# Patient Record
Sex: FEMALE | Race: BLACK OR AFRICAN AMERICAN | NOT HISPANIC OR LATINO | URBAN - METROPOLITAN AREA
[De-identification: names, ages, dates, MRNs, and addresses within clinical notes are randomized per-mention and may not be internally consistent; named-entity substitution may affect disease eponyms.]

---

## 2018-09-18 ENCOUNTER — OUTPATIENT (OUTPATIENT)
Dept: EMERGENCY DEPT | Facility: HOSPITAL | Age: 38
LOS: 1 days | Discharge: HOME | End: 2018-09-18
Payer: COMMERCIAL

## 2018-09-18 VITALS
OXYGEN SATURATION: 100 % | SYSTOLIC BLOOD PRESSURE: 146 MMHG | HEART RATE: 115 BPM | RESPIRATION RATE: 18 BRPM | DIASTOLIC BLOOD PRESSURE: 81 MMHG

## 2018-09-18 LAB
ALBUMIN SERPL ELPH-MCNC: 4.6 G/DL — SIGNIFICANT CHANGE UP (ref 3.3–5)
ALP SERPL-CCNC: 46 U/L — SIGNIFICANT CHANGE UP (ref 40–120)
ALT FLD-CCNC: 12 U/L — SIGNIFICANT CHANGE UP (ref 4–33)
APTT BLD: 31.7 SEC — SIGNIFICANT CHANGE UP (ref 27.5–37.4)
AST SERPL-CCNC: 16 U/L — SIGNIFICANT CHANGE UP (ref 4–32)
BASOPHILS # BLD AUTO: 0.03 K/UL — SIGNIFICANT CHANGE UP (ref 0–0.2)
BASOPHILS NFR BLD AUTO: 0.5 % — SIGNIFICANT CHANGE UP (ref 0–2)
BILIRUB SERPL-MCNC: 0.5 MG/DL — SIGNIFICANT CHANGE UP (ref 0.2–1.2)
BUN SERPL-MCNC: 14 MG/DL — SIGNIFICANT CHANGE UP (ref 7–23)
CALCIUM SERPL-MCNC: 9 MG/DL — SIGNIFICANT CHANGE UP (ref 8.4–10.5)
CHLORIDE SERPL-SCNC: 102 MMOL/L — SIGNIFICANT CHANGE UP (ref 98–107)
CO2 SERPL-SCNC: 18 MMOL/L — LOW (ref 22–31)
CREAT SERPL-MCNC: 0.69 MG/DL — SIGNIFICANT CHANGE UP (ref 0.5–1.3)
EOSINOPHIL # BLD AUTO: 0.06 K/UL — SIGNIFICANT CHANGE UP (ref 0–0.5)
EOSINOPHIL NFR BLD AUTO: 1 % — SIGNIFICANT CHANGE UP (ref 0–6)
GLUCOSE SERPL-MCNC: 92 MG/DL — SIGNIFICANT CHANGE UP (ref 70–99)
HCG SERPL-ACNC: < 5 MIU/ML — SIGNIFICANT CHANGE UP
HCT VFR BLD CALC: 38.5 % — SIGNIFICANT CHANGE UP (ref 34.5–45)
HGB BLD-MCNC: 12.5 G/DL — SIGNIFICANT CHANGE UP (ref 11.5–15.5)
IMM GRANULOCYTES # BLD AUTO: 0.01 # — SIGNIFICANT CHANGE UP
IMM GRANULOCYTES NFR BLD AUTO: 0.2 % — SIGNIFICANT CHANGE UP (ref 0–1.5)
INR BLD: 1 — SIGNIFICANT CHANGE UP (ref 0.88–1.17)
LYMPHOCYTES # BLD AUTO: 2.39 K/UL — SIGNIFICANT CHANGE UP (ref 1–3.3)
LYMPHOCYTES # BLD AUTO: 39.6 % — SIGNIFICANT CHANGE UP (ref 13–44)
MCHC RBC-ENTMCNC: 31.3 PG — SIGNIFICANT CHANGE UP (ref 27–34)
MCHC RBC-ENTMCNC: 32.5 % — SIGNIFICANT CHANGE UP (ref 32–36)
MCV RBC AUTO: 96.3 FL — SIGNIFICANT CHANGE UP (ref 80–100)
MONOCYTES # BLD AUTO: 0.42 K/UL — SIGNIFICANT CHANGE UP (ref 0–0.9)
MONOCYTES NFR BLD AUTO: 7 % — SIGNIFICANT CHANGE UP (ref 2–14)
NEUTROPHILS # BLD AUTO: 3.13 K/UL — SIGNIFICANT CHANGE UP (ref 1.8–7.4)
NEUTROPHILS NFR BLD AUTO: 51.7 % — SIGNIFICANT CHANGE UP (ref 43–77)
NRBC # FLD: 0 — SIGNIFICANT CHANGE UP
NT-PROBNP SERPL-SCNC: 34.55 PG/ML — SIGNIFICANT CHANGE UP
PLATELET # BLD AUTO: 255 K/UL — SIGNIFICANT CHANGE UP (ref 150–400)
PMV BLD: 9.5 FL — SIGNIFICANT CHANGE UP (ref 7–13)
POTASSIUM SERPL-MCNC: 3.2 MMOL/L — LOW (ref 3.5–5.3)
POTASSIUM SERPL-SCNC: 3.2 MMOL/L — LOW (ref 3.5–5.3)
PROT SERPL-MCNC: 7.7 G/DL — SIGNIFICANT CHANGE UP (ref 6–8.3)
PROTHROM AB SERPL-ACNC: 11.1 SEC — SIGNIFICANT CHANGE UP (ref 9.8–13.1)
RBC # BLD: 4 M/UL — SIGNIFICANT CHANGE UP (ref 3.8–5.2)
RBC # FLD: 12.2 % — SIGNIFICANT CHANGE UP (ref 10.3–14.5)
SODIUM SERPL-SCNC: 139 MMOL/L — SIGNIFICANT CHANGE UP (ref 135–145)
TROPONIN T, HIGH SENSITIVITY: < 6 NG/L — SIGNIFICANT CHANGE UP (ref ?–14)
WBC # BLD: 6.04 K/UL — SIGNIFICANT CHANGE UP (ref 3.8–10.5)
WBC # FLD AUTO: 6.04 K/UL — SIGNIFICANT CHANGE UP (ref 3.8–10.5)

## 2018-09-18 RX ORDER — POTASSIUM CHLORIDE 20 MEQ
40 PACKET (EA) ORAL ONCE
Qty: 0 | Refills: 0 | Status: COMPLETED | OUTPATIENT
Start: 2018-09-18 | End: 2018-09-18

## 2018-09-18 RX ORDER — ASPIRIN/CALCIUM CARB/MAGNESIUM 324 MG
162 TABLET ORAL ONCE
Qty: 0 | Refills: 0 | Status: COMPLETED | OUTPATIENT
Start: 2018-09-18 | End: 2018-09-18

## 2018-09-18 RX ORDER — MORPHINE SULFATE 50 MG/1
4 CAPSULE, EXTENDED RELEASE ORAL ONCE
Qty: 0 | Refills: 0 | Status: DISCONTINUED | OUTPATIENT
Start: 2018-09-18 | End: 2018-09-18

## 2018-09-18 RX ADMIN — Medication 40 MILLIEQUIVALENT(S): at 22:24

## 2018-09-18 RX ADMIN — Medication 162 MILLIGRAM(S): at 20:23

## 2018-09-18 RX ADMIN — MORPHINE SULFATE 4 MILLIGRAM(S): 50 CAPSULE, EXTENDED RELEASE ORAL at 20:23

## 2018-09-18 NOTE — ED PROVIDER NOTE - PROGRESS NOTE DETAILS
called to eval patient for stroke code.  patient states she had heaviness in arms, but not weakness. full strength in UE b/l.  full strength LE b/L.  stroke code not called 2/2 no cva symptoms Cabot: Patient's cardiologist is Dr. Moses, [phone].  Was planning to cath her soon and advised her to come to the ED if her CP recurred. LATIA Herr: Received signout on pt pending CTa and discussion with her cardiologist, CTa without PE, spoke with  who like pt admitted to  [phone], left message with  pending callback LATIA Herr: Unable to reach , spoke with hospitalist Dr. Patel who accepts this pt to her service,  to see in the morning

## 2018-09-18 NOTE — ED ADULT NURSE NOTE - NSIMPLEMENTINTERV_GEN_ALL_ED
Implemented All Universal Safety Interventions:  [place] to call system. Call bell, personal items and telephone within reach. Instruct patient to call for assistance. Room bathroom lighting operational. Non-slip footwear when patient is off stretcher. Physically safe environment: no spills, clutter or unnecessary equipment. Stretcher in lowest position, wheels locked, appropriate side rails in place.

## 2018-09-18 NOTE — ED ADULT NURSE NOTE - CHIEF COMPLAINT QUOTE
Pt c/o midsternal chest pain and left arm numbness x 3 hours, c/o SOB, unable to stand due to symptoms, crying in triage. Pt reports PMH of CVA, presented with left sided weakness. Pt also states she's supposed to be scheduled for cardiac cath due to chest pain she had over the past 2 weeks. Pt now reports numbness and tingling to face and states her legs feel heavy. FS 76. Evaluated by Dr. Guaman, no code stroke called.

## 2018-09-18 NOTE — ED ADULT NURSE REASSESSMENT NOTE - NS ED NURSE REASSESS COMMENT FT1
Report received from night RN Poncho. Pt able to ambulate independently to bathroom. Pt denies any chest pain, chest discomfort, SOB, difficulty breathing, N&V at this time and states she "feels completely better". Respirations are even and unlabored. breath sounds are clear.

## 2018-09-18 NOTE — ED PROVIDER NOTE - ATTENDING CONTRIBUTION TO CARE
I, Jennifer Cabot, MD, have performed a history and physical exam of the patient and discussed their management with the resident. I reviewed the resident's note and agree with the documented findings and plan of care. My medical decision making and observations are found above.    Cabot: 38F wth PMH of CVA w/o residual deficits, recent CP with stress last week showing "blockages" with her cardiologist, Dr. Moses, planning to cath, coming in for worsened CP and SOB.  On exam, HDS, NAD, MMM, eyes clear, lungs CTAB, heart sounds normal, abd soft, NT, ND, no CVAT, LEs without edema, wwp, skin normal temperature and color, neuro: alert and oriented, no focal deficits.  EKG with RBBB.  First trop < 6.  DDx MSK pain, ACS, PE.  Will trend trops, obtain CTA chest, and speak with her cardiologist's office.  Anticipate admission for cath.

## 2018-09-18 NOTE — ED ADULT TRIAGE NOTE - CHIEF COMPLAINT QUOTE
Pt c/o midsternal chest pain and left arm numbness x 3 hours, c/o SOB, unable to stand due to symptoms, crying in triage. Pt reports PMH of CVA, presented with left sided weakness. Pt also states she's supposed to be scheduled for cardiac cath due to chest pain she had. Pt now reports numbness and tingling to face and states her legs feel heavy. Pt c/o midsternal chest pain and left arm numbness x 3 hours, c/o SOB, unable to stand due to symptoms, crying in triage. Pt reports PMH of CVA, presented with left sided weakness. Pt also states she's supposed to be scheduled for cardiac cath due to chest pain she had over the past 2 weeks. Pt now reports numbness and tingling to face and states her legs feel heavy. FS 76. Evaluated by Dr. Guaman, no code stroke called.

## 2018-09-18 NOTE — ED ADULT NURSE NOTE - OBJECTIVE STATEMENT
Pt to bed 1. Alert and oriented x 3. Pt c/o intermittent chest tightness for a few months. Pt today developed left sided arm numbness and tingling. Pt hooked up to CM. IVL placed. Bloods drawn. Will continue to monitor.

## 2018-09-18 NOTE — ED PROVIDER NOTE - MEDICAL DECISION MAKING DETAILS
38F hx cva p/w cp, sob. reportedly supposed to get cath soon. pt with no rfs for PE, but with pleuritic CP, tachycardic, tachypnic and with RBBB on ekg. will CTA. discuss with cards. reassess. likely admit 38F hx cva p/w cp, sob. reportedly supposed to get cath soon. pt with no rfs for PE, but with pleuritic CP, tachycardic, tachypnic and with RBBB on ekg. will CTA. discuss with cards. reassess. likely admit.    Cabot: 38F wth PMH of CVA w/o residual deficits, recent CP with stress last week showing "blockages" with her cardiologist, Dr. Moses, planning to cath, coming in for worsened CP and SOB.  On exam, HDS, NAD, MMM, eyes clear, lungs CTAB, heart sounds normal, abd soft, NT, ND, no CVAT, LEs without edema, wwp, skin normal temperature and color, neuro: alert and oriented, no focal deficits.  EKG with RBBB.  First trop < 6.  DDx MSK pain, ACS, PE.  Will trend trops, obtain CTA chest, and speak with her cardiologist's office.  Anticipate admission for cath.

## 2018-09-18 NOTE — ED PROVIDER NOTE - OBJECTIVE STATEMENT
Tiffani Riggins M.D: 38F hx CVA w/o residual deficits, p/w episode of chest tightness assocaited with nausea, SOB and left arm numbness/tingling/heaviness. has been intermittent for the past two weeks is supposed to get a cath Tiffani Riggins M.D: 38F hx CVA w/o residual deficits, p/w episode of chest tightness assocaited with nausea, SOB and left arm numbness/tingling/heaviness. has been intermittent for the past two weeks is supposed to get a cath soon with her cardiologist. notes symptoms have been worsening throughout the day prompting visit. cp has some pleuritic nature to it    cards: vanessa

## 2018-09-18 NOTE — ED PROVIDER NOTE - PHYSICAL EXAMINATION
Tiffani Riggins M.D.:   patient awake alert seen lying on stretcher, tachyppnic, not finishing full sentences. .   LUNGS CTAB no wheeze no crackle.   CARD RRR no m/r/g.    Abdomen soft NT ND no rebound no guarding no CVA tenderness.   EXT WWP no edema no calf tenderness CV 2+DP/PT bilaterally.   neuro A&Ox3 gait normal.    skin warm and dry no rash  HEENT: moist mucous membranes, PERRL, EOMI

## 2018-09-19 ENCOUNTER — TRANSCRIPTION ENCOUNTER (OUTPATIENT)
Age: 38
End: 2018-09-19

## 2018-09-19 VITALS — HEIGHT: 66 IN | WEIGHT: 160.06 LBS

## 2018-09-19 DIAGNOSIS — R07.9 CHEST PAIN, UNSPECIFIED: ICD-10-CM

## 2018-09-19 DIAGNOSIS — I63.9 CEREBRAL INFARCTION, UNSPECIFIED: ICD-10-CM

## 2018-09-19 DIAGNOSIS — Z29.9 ENCOUNTER FOR PROPHYLACTIC MEASURES, UNSPECIFIED: ICD-10-CM

## 2018-09-19 DIAGNOSIS — I20.0 UNSTABLE ANGINA: ICD-10-CM

## 2018-09-19 DIAGNOSIS — I20.1 ANGINA PECTORIS WITH DOCUMENTED SPASM: ICD-10-CM

## 2018-09-19 LAB
BUN SERPL-MCNC: 11 MG/DL — SIGNIFICANT CHANGE UP (ref 7–23)
CALCIUM SERPL-MCNC: 8.3 MG/DL — LOW (ref 8.4–10.5)
CHLORIDE SERPL-SCNC: 104 MMOL/L — SIGNIFICANT CHANGE UP (ref 98–107)
CO2 SERPL-SCNC: 22 MMOL/L — SIGNIFICANT CHANGE UP (ref 22–31)
CREAT SERPL-MCNC: 0.64 MG/DL — SIGNIFICANT CHANGE UP (ref 0.5–1.3)
GLUCOSE SERPL-MCNC: 85 MG/DL — SIGNIFICANT CHANGE UP (ref 70–99)
POTASSIUM SERPL-MCNC: 3.9 MMOL/L — SIGNIFICANT CHANGE UP (ref 3.5–5.3)
POTASSIUM SERPL-SCNC: 3.9 MMOL/L — SIGNIFICANT CHANGE UP (ref 3.5–5.3)
SODIUM SERPL-SCNC: 138 MMOL/L — SIGNIFICANT CHANGE UP (ref 135–145)
TROPONIN T, HIGH SENSITIVITY: < 6 NG/L — SIGNIFICANT CHANGE UP (ref ?–14)

## 2018-09-19 PROCEDURE — 93010 ELECTROCARDIOGRAM REPORT: CPT

## 2018-09-19 RX ORDER — HEPARIN SODIUM 5000 [USP'U]/ML
5000 INJECTION INTRAVENOUS; SUBCUTANEOUS EVERY 12 HOURS
Qty: 0 | Refills: 0 | Status: DISCONTINUED | OUTPATIENT
Start: 2018-09-19 | End: 2018-09-19

## 2018-09-19 RX ORDER — SIMVASTATIN 20 MG/1
40 TABLET, FILM COATED ORAL AT BEDTIME
Qty: 0 | Refills: 0 | Status: DISCONTINUED | OUTPATIENT
Start: 2018-09-19 | End: 2018-09-19

## 2018-09-19 RX ORDER — SODIUM CHLORIDE 9 MG/ML
500 INJECTION INTRAMUSCULAR; INTRAVENOUS; SUBCUTANEOUS
Qty: 0 | Refills: 0 | Status: DISCONTINUED | OUTPATIENT
Start: 2018-09-19 | End: 2018-09-19

## 2018-09-19 RX ORDER — ASPIRIN/CALCIUM CARB/MAGNESIUM 324 MG
81 TABLET ORAL DAILY
Qty: 0 | Refills: 0 | Status: DISCONTINUED | OUTPATIENT
Start: 2018-09-19 | End: 2018-09-19

## 2018-09-19 RX ORDER — SIMVASTATIN 20 MG/1
1 TABLET, FILM COATED ORAL
Qty: 0 | Refills: 0 | COMMUNITY
Start: 2018-09-19

## 2018-09-19 RX ADMIN — Medication 81 MILLIGRAM(S): at 11:41

## 2018-09-19 NOTE — DISCHARGE NOTE ADULT - PLAN OF CARE
Resolution of symptoms Follow up with your Cardiologist or Primary care doctor in 1-2 weeks  If symptoms return call your doctor and if unreachable return to Emergency Department Continue Aspirin 81mg

## 2018-09-19 NOTE — H&P ADULT - RS GEN PE MLT RESP DETAILS PC
respirations non-labored/good air movement/no wheezes/no rhonchi/clear to auscultation bilaterally/no chest wall tenderness/airway patent/no rales/breath sounds equal

## 2018-09-19 NOTE — DISCHARGE NOTE ADULT - MEDICATION SUMMARY - MEDICATIONS TO STOP TAKING
I will STOP taking the medications listed below when I get home from the hospital:    aspirin 325 mg oral tablet  -- 1 tab(s) by mouth once a day    simvastatin 40 mg oral tablet  -- 1 tab(s) by mouth once a day (at bedtime)    clopidogrel 75 mg oral tablet  -- 1 tab(s) by mouth once a day    heparin 10,000 units/mL injectable solution  -- 1 milliliter(s) injectable once a day    simvastatin 40 mg oral tablet  -- 1 tab(s) by mouth once a day (at bedtime)

## 2018-09-19 NOTE — DISCHARGE NOTE ADULT - PATIENT PORTAL LINK FT
You can access the [facility] Patient Portal, offered by [facility], by registering with the following website: [email or web address]

## 2018-09-19 NOTE — H&P ADULT - NEGATIVE MUSCULOSKELETAL SYMPTOMS
no muscle cramps/no muscle weakness/no neck pain/no joint swelling/no myalgia/no arm pain L/no arm pain R/no arthritis/no stiffness/no leg pain R/no back pain/no leg pain L/no arthralgia

## 2018-09-19 NOTE — DISCHARGE NOTE ADULT - HOSPITAL COURSE
39 y/o W with PHMx CVA w/o residual deficits July 2015 presents to the ED with chest tightness, SOB, bilateral arm numbness/heaviness, L face tingling/heaviness x 3 days. Chest pain has been intermittent for 2 wks, each episode lasts a few minutes associated with SOB, diaphoresis,  and nausea. Chest tightness worsened yesterday evening as she was sitting at home, had 3 episodes within 5 minutes, substernal, non-radiating, 7/10, worse with walking/doing household chores, better with rest, not changed with diet/not reproducible/not pleuritic/ not positional. Pt admits to L>R arm tingling/heaviness and L face numbness/tingling started yesterday, L arm felt like "a ton of bricks." Pt reports mouth felt like pins and needles with tingling on the L side of her face. Pt never had these symptoms before. Pt saw her cardiologist Dr. Moses last week. Dr. Moses states patient had positive outpatient stress test and planned for LHC for patient. Pt denies jaw pain, intermittent claudication, palpitations, orthopnea, syncope, cough, facial droop, slurred speech, weakness, paraesthesias, peripheral edema, abdominal pain, V/C/D neck pain, vision changes, fevers, chills.     Pt had CTPA showing no PE.  Pt had cardiac catheretization showing normal coronaries.  Pt stable for discharge home and plan discussed with Attending

## 2018-09-19 NOTE — H&P ADULT - PROBLEM SELECTOR PLAN 1
Admit to telemetry for monitoring and cardiac w/u   Serial EKGs.   Continue ASA. Start Simvastatin and Heparin 5000U SQ TID  AM labs HgA1C, FLP, TSH, CBC, CMP Admit to telemetry for monitoring and cardiac w/u   Serial EKGs.   Continue ASA. Start Simvastatin and Heparin 5000U SQ TID  AM labs HgA1C, FLP, TSH, CBC, CMP  Consider cardiac cath, consult with Dr. Moses regarding plan Possible cardiac catheterization with possible coronary angioplasty and stent placement  CE x 3, telemetry , serial EKG's   asa, zocor consider add BB Patient presents with exertional chest pain/pressure x2 weeks. Reportedly with positive outpatient stress test. No ST/T changes on ECG. hsTPN wnl x2. Low suspicion for acute infarction or unstable angina.    Cardiology recommendations reviewed. Patient to undergo LHC today.    -c/w ASA  -f/u cardiology recs

## 2018-09-19 NOTE — H&P ADULT - NSHPLABSRESULTS_GEN_ALL_CORE
LABS:                        12.5   6.04  )-----------( 255      ( 18 Sep 2018 19:44 )             38.5     09-18    139  |  102  |  14  ----------------------------<  92  3.2<L>   |  18<L>  |  0.69    Ca    9.0      18 Sep 2018 19:44    TPro  7.7  /  Alb  4.6  /  TBili  0.5  /  DBili  x   /  AST  16  /  ALT  12  /  AlkPhos  46  09-18    PT/INR - ( 18 Sep 2018 19:44 )   PT: 11.1 SEC;   INR: 1.00          PTT - ( 18 Sep 2018 19:44 )  PTT:31.7 SEC            RADIOLOGY & ADDITIONAL TESTS:    ECG Personally Reviewed - SR, RBBB    Imaging Personally Reviewed: CXR clear lungs    Imaging Reviewed: CT angio chest - no PE or pneumonia    Consultant(s) Notes Reviewed:  Cardiology    Care Discussed with Consultants/Other Providers:

## 2018-09-19 NOTE — H&P ADULT - HISTORY OF PRESENT ILLNESS
39 y/o F with PHMx CVA w/o residual deficits July 2015 presents to the ED with chest tightness, SOB, L>R arm numbness/heaviness, L face tingling/heaviness. Chest pain has been intermittent for 2 wks, each episode lasts a few minutes associated with SOB, diaphoresis, cold sweats, and nausea. Chest tightness worsened yesterday evening as she was sitting at home, had 3 episodes within 5 minutes, substernal, non-radiating, 7/10, worse with walking/doing household chores, better with rest, not changed with diet/reproducible/pleuritic/positional. Pt admits to L>R arm tingling/heaviness and L face numbness/tingling started yesterday, L arm felt like "a tone of bricks." Pt reports mouth felt like pins and needles with tingling on the L side of her face. Pt never had these symptoms before. Pt saw her cardiologist Dr. Moses last week, was planning on getting a cath soon.     Pt denies jaw pain, claudication, palpitations, orthopnea, cough, facial droop, slurred speech, weakness, peripheral edema, abdominal pain, neck pain, vision changes, fevers, chills. 37 y/o F with PHMx CVA w/o residual deficits July 2015 presents to the ED with chest tightness, SOB, L>R arm numbness/heaviness, L face tingling/heaviness. Chest pain has been intermittent for 2 wks, each episode lasts a few minutes associated with SOB, diaphoresis, cold sweats, and nausea. Chest tightness worsened yesterday evening as she was sitting at home, had 3 episodes within 5 minutes, substernal, non-radiating, 7/10, worse with walking/doing household chores, better with rest, not changed with diet/reproducible/pleuritic/positional. Pt admits to L>R arm tingling/heaviness and L face numbness/tingling started yesterday, L arm felt like "a ton of bricks." Pt reports mouth felt like pins and needles with tingling on the L side of her face. Pt never had these symptoms before. Pt saw her cardiologist Dr. Moses last week, was planning on getting a cath soon.     Pt denies jaw pain, claudication, palpitations, orthopnea, cough, facial droop, slurred speech, weakness, peripheral edema, abdominal pain, neck pain, vision changes, fevers, chills. 39 y/o F with PHMx CVA w/o residual deficits July 2015 presents to the ED with chest tightness, SOB, bilateral  arm numbness/heaviness, L face tingling/heaviness x 3 days. Chest pain has been intermittent for 2 wks, each episode lasts a few minutes associated with SOB, diaphoresis,  and nausea. Chest tightness worsened yesterday evening as she was sitting at home, had 3 episodes within 5 minutes, substernal, non-radiating, 7/10, worse with walking/doing household chores, better with rest, not changed with diet/not reproducible/not pleuritic/ not positional. Pt admits to L>R arm tingling/heaviness and L face numbness/tingling started yesterday, L arm felt like "a ton of bricks." Pt reports mouth felt like pins and needles with tingling on the L side of her face. Pt never had these symptoms before. Pt saw her cardiologist Dr. Moses last week, was planning on getting a cath soon. Pt denies jaw pain, intermittent claudication, palpitations, orthopnea, syncope , cough, facial droop, slurred speech, weakness, paraesthesias, peripheral edema, abdominal pain, V/C/D neck pain, vision changes, fevers, chills. 39 y/o W with PHMx CVA w/o residual deficits July 2015 presents to the ED with chest tightness, SOB, bilateral arm numbness/heaviness, L face tingling/heaviness x 3 days. Chest pain has been intermittent for 2 wks, each episode lasts a few minutes associated with SOB, diaphoresis,  and nausea. Chest tightness worsened yesterday evening as she was sitting at home, had 3 episodes within 5 minutes, substernal, non-radiating, 7/10, worse with walking/doing household chores, better with rest, not changed with diet/not reproducible/not pleuritic/ not positional. Pt admits to L>R arm tingling/heaviness and L face numbness/tingling started yesterday, L arm felt like "a ton of bricks." Pt reports mouth felt like pins and needles with tingling on the L side of her face. Pt never had these symptoms before. Pt saw her cardiologist Dr. Moses last week. Dr. Moses states patient had positive outpatient stress test and planned for LHC for patient. Pt denies jaw pain, intermittent claudication, palpitations, orthopnea, syncope, cough, facial droop, slurred speech, weakness, paraesthesias, peripheral edema, abdominal pain, V/C/D neck pain, vision changes, fevers, chills.

## 2018-09-19 NOTE — ED ADULT NURSE REASSESSMENT NOTE - NS ED NURSE REASSESS COMMENT FT1
pt is admitted to tele pt VSS NAD pt is awaiting for a bed assignment awaiting further orders Will continue to monitor the pt

## 2018-09-19 NOTE — H&P ADULT - FAMILY HISTORY
Family history of hypertrophic cardiomyopathy, cousin age 23     Aunt  Still living? Unknown  Family history of migraine headaches, Age at diagnosis: Age Unknown     Uncle  Still living? Unknown  Family history of CVA, Age at diagnosis: Age Unknown  Family history of TIAs, Age at diagnosis: Age Unknown     Grandparent  Still living? Unknown  Family history of mitral valve prolapse, Age at diagnosis: Age Unknown     Father  Still living? Unknown  Family history of hypertension in father, Age at diagnosis: Age Unknown  Family history of hyperlipidemia, Age at diagnosis: Age Unknown

## 2018-09-19 NOTE — H&P ADULT - ASSESSMENT
37 y/o F with PHMx CVA w/o residual deficits July 2015 presents to the ED with chest tightness, SOB, L>R arm numbness/heaviness, L face tingling/heaviness found to have negative cardiac markers, no pulmonary embolism or pneumonia seen on CTA admitted to telemetry for monitoring and cardiac w/u    EKG:   Trop: <6 --> <6  BNP: 34  CTA: No pulmonary embolism or pneumonia. 37 y/o F with PHMx CVA w/o residual deficits July 2015 presents to the ED with Unstable angina MI rule dout    Trop: <6 --> <6  BNP: 34  CTA: No pulmonary embolism or pneumonia. 37 y/o F with PHMx CVA w/o residual deficits July 2015 presents to the ED with c/o chest pain. Patient reportedly with positive stress test as outpatient. Seen by cardiology who recommends LHC.

## 2018-09-19 NOTE — DISCHARGE NOTE ADULT - CARE PROVIDER_API CALL
Yunior Rees), Medicine  [address] [place] [address]  Phone: [phone]  Fax: [phone]    Boubacar Moses (), Cardiology; Internal Medicine  [address] [place] [address] [address]  [place] [address]  Phone: [phone]  Fax: [phone]

## 2018-09-19 NOTE — H&P ADULT - NEGATIVE GENERAL SYMPTOMS
no anorexia/no weight loss/no malaise/no fatigue/no weight gain/no polyphagia/no polyuria/no polydipsia/no fever/no chills

## 2018-09-19 NOTE — H&P ADULT - REASON FOR ADMISSION
chest tightness, SOB, L>R arm numbness/tingling/heaviness, L face tingling/numbness "chest tightness  x 2 weeks"

## 2018-09-19 NOTE — DISCHARGE NOTE ADULT - MEDICATION SUMMARY - MEDICATIONS TO TAKE
I will START or STAY ON the medications listed below when I get home from the hospital:    Aspirin Enteric Coated 81 mg oral delayed release tablet  -- 1 tab(s) by mouth once a day  -- Indication: For CVA (cerebral vascular accident)

## 2018-09-19 NOTE — H&P ADULT - NSHPPHYSICALEXAM_GEN_ALL_CORE
Vital Signs Last 24 Hrs  T(C): 36.8 (19 Sep 2018 06:25), Max: 37.1 (19 Sep 2018 04:02)  T(F): 98.3 (19 Sep 2018 06:25), Max: 98.7 (19 Sep 2018 04:02)  HR: 66 (19 Sep 2018 06:25) (66 - 115)  BP: 108/72 (19 Sep 2018 06:25) (108/72 - 146/81)  BP(mean): --  RR: 16 (19 Sep 2018 06:25) (15 - 18)  SpO2: 100% (19 Sep 2018 06:25) (99% - 100%)

## 2018-09-19 NOTE — H&P ADULT - NEGATIVE OPHTHALMOLOGIC SYMPTOMS
no diplopia/no pain R/no loss of vision L/no loss of vision R/no pain L/no blurred vision L/no blurred vision R

## 2018-09-19 NOTE — H&P ADULT - NEGATIVE NEUROLOGICAL SYMPTOMS
no syncope/no weakness/no generalized seizures/no loss of consciousness/no transient paralysis/no headache/no focal seizures/no difficulty walking

## 2018-09-19 NOTE — DISCHARGE NOTE ADULT - CARE PLAN
Principal Discharge DX:	Chest pain, unspecified type  Goal:	Resolution of symptoms  Assessment and plan of treatment:	Follow up with your Cardiologist or Primary care doctor in 1-2 weeks  If symptoms return call your doctor and if unreachable return to Emergency Department  Secondary Diagnosis:	CVA (cerebral vascular accident)  Assessment and plan of treatment:	Continue Aspirin 81mg

## 2018-09-19 NOTE — H&P ADULT - NSHPSOCIALHISTORY_GEN_ALL_CORE
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 Never drinks alcohol, smoke or use illicit drugs.

## 2018-09-19 NOTE — H&P ADULT - NEGATIVE GASTROINTESTINAL SYMPTOMS
no abdominal pain/no melena/no hematochezia/no change in bowel habits/no vomiting/no diarrhea/no constipation

## 2018-09-19 NOTE — PROGRESS NOTE ADULT - SUBJECTIVE AND OBJECTIVE BOX
CHIEF COMPLAINT: chest pain    HISTORY OF PRESENT ILLNESS: Patient is a 38 year old female with past medical history significant for CVA, who presents with complaints of left sided chest pain ongoing for the past few hours intermittently. Patient is known to me from my outpatient practice and recently had an exercise stress echocardiogram and was awaiting an outpatient cardiac catheterization.    PAST MEDICAL & SURGICAL HISTORY:  CVA (cerebral vascular accident): s/p July 2015  No significant past surgical history          MEDICATIONS:  aspirin enteric coated 81 milliGRAM(s) Oral daily  heparin  Injectable 5000 Unit(s) SubCutaneous every 12 hours            simvastatin 40 milliGRAM(s) Oral at bedtime              REVIEW OF SYSTEMS:  CONSTITUTIONAL: No fever, weight loss, or fatigue  EYES: No eye pain, visual disturbances, or discharge  ENMT:  No difficulty hearing, tinnitus, vertigo; No sinus or throat pain  NECK: No pain or stiffness  RESPIRATORY: No cough, wheezing, chills or hemoptysis; No Shortness of Breath  CARDIOVASCULAR: + Chest pain  GASTROINTESTINAL: No abdominal or epigastric pain. No nausea, vomiting, or hematemesis; No diarrhea or constipation. No melena or hematochezia.  GENITOURINARY: No dysuria, frequency, hematuria, or incontinence  NEUROLOGICAL: No headaches, memory loss, loss of strength, numbness, or tremors  SKIN: No itching, burning, rashes, or lesions   LYMPH Nodes: No enlarged glands  ENDOCRINE: No heat or cold intolerance; No hair loss  MUSCULOSKELETAL: No joint pain or swelling; No muscle, back, or extremity pain  PSYCHIATRIC: No depression, anxiety, mood swings, or difficulty sleeping  HEME/LYMPH: No easy bruising, or bleeding gums  ALLERY AND IMMUNOLOGIC: No hives or eczema	      PHYSICAL EXAM:  T(C): 36.8 (09-19-18 @ 06:25), Max: 37.1 (09-19-18 @ 04:02)  HR: 66 (09-19-18 @ 06:25) (66 - 115)  BP: 108/72 (09-19-18 @ 06:25) (108/72 - 146/81)  RR: 16 (09-19-18 @ 06:25) (15 - 18)  SpO2: 100% (09-19-18 @ 06:25) (99% - 100%)  Wt(kg): --  I&O's Summary      Appearance: Normal	  HEENT:   Normal oral mucosa, PERRL, EOMI	  Lymphatic: No lymphadenopathy  Cardiovascular: Normal S1 S2, No JVD, No murmurs, No edema  Respiratory: Lungs clear to auscultation	  Psychiatry: A & O x 3, Mood & affect appropriate  Gastrointestinal:  Soft, Non-tender, + BS	  Skin: No rashes, No ecchymoses, No cyanosis	  Neurologic: Non-focal  Extremities: Normal range of motion, No clubbing, cyanosis or edema  Vascular: Peripheral pulses palpable 2+ bilaterally    TELEMETRY: Sinus rhythm    ECG:  Sinus rhythm, Nonspecific ST changes  	  	  LABS:	 	    CARDIAC MARKERS:                                  12.5   6.04  )-----------( 255      ( 18 Sep 2018 19:44 )             38.5     09-18    139  |  102  |  14  ----------------------------<  92  3.2<L>   |  18<L>  |  0.69    Ca    9.0      18 Sep 2018 19:44    TPro  7.7  /  Alb  4.6  /  TBili  0.5  /  DBili  x   /  AST  16  /  ALT  12  /  AlkPhos  46  09-18    proBNP: Serum Pro-Brain Natriuretic Peptide: 34.55 pg/mL (09-18 @ 19:44)

## 2018-09-19 NOTE — ED ADULT NURSE REASSESSMENT NOTE - NS ED NURSE REASSESS COMMENT FT1
Report received from break MARGE Mcclendon. Pt is resting in spot 5A at this time. Pt denies any chest pain,, SOB, difficulty breathing, or any discomfort at this time.

## 2018-09-27 RX ORDER — ASPIRIN/CALCIUM CARB/MAGNESIUM 324 MG
1 TABLET ORAL
Qty: 0 | Refills: 0 | COMMUNITY

## 2019-06-13 ENCOUNTER — TRANSCRIPTION ENCOUNTER (OUTPATIENT)
Age: 39
End: 2019-06-13

## 2020-11-25 NOTE — H&P ADULT - NEGATIVE SKIN SYMPTOMS
able to climbs a flight of stairs with a crutch, walking, ADLs, house chores no itching/no dryness/no rash

## 2022-07-19 NOTE — PATIENT PROFILE ADULT. - FALL HARM RISK TYPE OF ASSESSMENT
Detail Level: Detailed
Quality 431: Preventive Care And Screening: Unhealthy Alcohol Use - Screening: Patient not identified as an unhealthy alcohol user when screened for unhealthy alcohol use using a systematic screening method
Quality 110: Preventive Care And Screening: Influenza Immunization: Influenza immunization was not ordered or administered, reason not given
Quality 226: Preventive Care And Screening: Tobacco Use: Screening And Cessation Intervention: Patient screened for tobacco use and is an ex/non-smoker
Admission

## 2025-04-25 NOTE — ED ADULT NURSE NOTE - NS ED NOTE ABUSE RESPONSE YN
Regarding: decrease in movement of the baby and also she vomited bile/stomach acid this morning..  ----- Message from Gladis BRIGHT sent at 4/25/2025  5:12 PM CDT -----  Patient Name: Eloisa Murphy    Specialist or PCP Name: Isabelle Moran MD    Symptoms: decrease in movement of the baby and also she vomited bile/stomach acid this morning..     Pregnant (females aged 13-60. If Yes, how long?) : 25 weeks    Call Back # : [phone]    Which State are you currently located in?: [place]    Name of Clinic Site / Acct# : [address]    Call arrived during: After Hours     Yes